# Patient Record
Sex: FEMALE | Race: OTHER
[De-identification: names, ages, dates, MRNs, and addresses within clinical notes are randomized per-mention and may not be internally consistent; named-entity substitution may affect disease eponyms.]

---

## 2018-06-23 ENCOUNTER — HOSPITAL ENCOUNTER (EMERGENCY)
Dept: HOSPITAL 61 - ER | Age: 33
Discharge: HOME | End: 2018-06-23
Payer: OTHER GOVERNMENT

## 2018-06-23 VITALS
SYSTOLIC BLOOD PRESSURE: 124 MMHG | DIASTOLIC BLOOD PRESSURE: 84 MMHG | SYSTOLIC BLOOD PRESSURE: 124 MMHG | DIASTOLIC BLOOD PRESSURE: 84 MMHG

## 2018-06-23 DIAGNOSIS — J02.9: Primary | ICD-10-CM

## 2018-06-23 LAB
NEGATIVE OBC STREP: (no result)
POSITIVE OBC STREP: (no result)

## 2018-06-23 PROCEDURE — 87880 STREP A ASSAY W/OPTIC: CPT

## 2018-06-23 PROCEDURE — 99283 EMERGENCY DEPT VISIT LOW MDM: CPT

## 2018-06-23 PROCEDURE — 87070 CULTURE OTHR SPECIMN AEROBIC: CPT

## 2018-06-23 RX ADMIN — IBUPROFEN 1 MG: 600 TABLET ORAL at 07:58

## 2018-11-13 ENCOUNTER — HOSPITAL ENCOUNTER (OUTPATIENT)
Dept: HOSPITAL 61 - US | Age: 33
Discharge: HOME | End: 2018-11-13
Attending: NURSE PRACTITIONER
Payer: OTHER GOVERNMENT

## 2018-11-13 DIAGNOSIS — R10.11: Primary | ICD-10-CM

## 2018-11-13 PROCEDURE — 76700 US EXAM ABDOM COMPLETE: CPT

## 2018-11-13 NOTE — RAD
Complete abdominal ultrasound 11/13/2018 3:58 PM

 

Clinical History: RUQ PAIN

 

Technique:  Ultrasound examination of the abdomen was performed, and 

multiple static images were submitted for review.

 

Comparison:  None 

 

Findings:

 

The visualized portions of the pancreas are grossly unremarkable. 

Visualized portions of the aorta and IVC are unremarkable. The liver 

measures 15 cm longitudinally which is within normal limits. No focal 

hepatic abnormalities are identified on provided imaging. Liver 

echotexture is within normal limits. The common bile duct measures 2 mm in

diameter which is normal. The gallbladder is contracted. This limits 

evaluation of wall thickening. Gross evidence of wall thickening is not 

identified. The right kidney measures 10.7 cm in length. Right kidney is 

no renal appearance otherwise. Spleen is normal in size measuring 

approximately 8 cm in length. Left kidney is somewhat poorly visualized 

but measures approximately 11 cm longitudinally which is within normal 

limits. Limited visualization of the right lower quadrant is grossly 

unremarkable.

 

IMPRESSION: 

1.Contracted gallbladder. No gross cholelithiasis is identified.

2. Otherwise unremarkable abdominal sonogram

 

Electronically signed by: Reuben Epperson MD (11/13/2018 4:23 PM) [id number]

## 2018-12-06 ENCOUNTER — HOSPITAL ENCOUNTER (OUTPATIENT)
Dept: HOSPITAL 61 - NM | Age: 33
Discharge: HOME | End: 2018-12-06
Attending: SURGERY
Payer: OTHER GOVERNMENT

## 2018-12-06 DIAGNOSIS — R10.11: Primary | ICD-10-CM

## 2018-12-06 PROCEDURE — 78227 HEPATOBIL SYST IMAGE W/DRUG: CPT

## 2018-12-06 PROCEDURE — A9537 TC99M MEBROFENIN: HCPCS

## 2018-12-06 NOTE — RAD
Hepatobiliary scan with gallbladder ejection fraction

 

Clinical indications: Right upper quadrant pain for 6 months.

 

COMPARISON: No previous hepatobiliary scan. Abdomen ultrasound study dated

November 13, 2018.

 

TECHNIQUE: After IV infusion of 5.5 mCi of technetium 99m Choletec, 

anterior planar images of the upper abdomen were performed in sequential 

fashion and a time/activity curve was generated. The patient consumed 8 

ounces of Boost drink and a gallbladder ejection fraction was measured and

calculated.

 

FINDINGS: Homogeneous uptake is seen throughout the liver.  Radiotracer 

activity is seen within the gallbladder by 11 minutes. Radiotracer 

activity is seen within the duodenum by 21 minutes. Gallbladder ejection 

fraction is measured and calculated to be 33%. Normal is greater than 35%.

Therefore this is low.

 

IMPRESSION: The cystic duct is patent. Low gallbladder ejection fraction 

of 33%.

 

Electronically signed by: Gulshan Vasquez MD (12/6/2018 4:56 PM) [id number]